# Patient Record
Sex: FEMALE | Race: WHITE | NOT HISPANIC OR LATINO | URBAN - METROPOLITAN AREA
[De-identification: names, ages, dates, MRNs, and addresses within clinical notes are randomized per-mention and may not be internally consistent; named-entity substitution may affect disease eponyms.]

---

## 2018-03-11 ENCOUNTER — EMERGENCY (EMERGENCY)
Facility: HOSPITAL | Age: 23
LOS: 1 days | Discharge: HOME | End: 2018-03-11
Admitting: EMERGENCY MEDICINE
Payer: COMMERCIAL

## 2018-03-11 VITALS
OXYGEN SATURATION: 100 % | DIASTOLIC BLOOD PRESSURE: 72 MMHG | HEART RATE: 108 BPM | TEMPERATURE: 98 F | SYSTOLIC BLOOD PRESSURE: 130 MMHG | RESPIRATION RATE: 16 BRPM

## 2018-03-11 PROCEDURE — 99282 EMERGENCY DEPT VISIT SF MDM: CPT

## 2018-03-11 RX ORDER — HYDROXYZINE HCL 10 MG
25 TABLET ORAL ONCE
Qty: 0 | Refills: 0 | Status: DISCONTINUED | OUTPATIENT
Start: 2018-03-11 | End: 2018-03-15

## 2018-03-11 NOTE — ED PROVIDER NOTE - MEDICAL DECISION MAKING DETAILS
23 y/o F hx Anxiety   To follow up with the [facility].  Hydroxyzine PRN  recommended   Medical evaluation performed. There is no clinical evidence of intoxication or any acute medical problem requiring immediate intervention.

## 2018-03-11 NOTE — ED ADULT TRIAGE NOTE - CHIEF COMPLAINT QUOTE
reports increasing anxiety over past 2 mos reports " depressing thoughts"  denies suicidal,homicidal thoughts. denies auditory,visual hallucinations

## 2018-03-11 NOTE — ED ADULT NURSE NOTE - OBJECTIVE STATEMENT
Received pt in  pt c/o anxiety/OCD pt denies SI/Hi/AVh at present emotional reassurance provided, eval on going.

## 2018-08-02 ENCOUNTER — EMERGENCY (EMERGENCY)
Facility: HOSPITAL | Age: 23
LOS: 1 days | Discharge: HOME | End: 2018-08-02
Attending: EMERGENCY MEDICINE
Payer: COMMERCIAL

## 2018-08-02 VITALS
OXYGEN SATURATION: 100 % | HEIGHT: 69 IN | SYSTOLIC BLOOD PRESSURE: 118 MMHG | HEART RATE: 101 BPM | TEMPERATURE: 98 F | RESPIRATION RATE: 18 BRPM | DIASTOLIC BLOOD PRESSURE: 75 MMHG | WEIGHT: 139.99 LBS

## 2018-08-02 DIAGNOSIS — R69 ILLNESS, UNSPECIFIED: ICD-10-CM

## 2018-08-02 DIAGNOSIS — F42.9 OBSESSIVE-COMPULSIVE DISORDER, UNSPECIFIED: ICD-10-CM

## 2018-08-02 DIAGNOSIS — F32.89 OTHER SPECIFIED DEPRESSIVE EPISODES: ICD-10-CM

## 2018-08-02 LAB
ALBUMIN SERPL ELPH-MCNC: 4.3 G/DL — SIGNIFICANT CHANGE UP (ref 3.3–5)
ALP SERPL-CCNC: 86 U/L — SIGNIFICANT CHANGE UP (ref 40–120)
ALT FLD-CCNC: 13 U/L — SIGNIFICANT CHANGE UP (ref 12–78)
ANION GAP SERPL CALC-SCNC: 10 MMOL/L — SIGNIFICANT CHANGE UP (ref 5–17)
APAP SERPL-MCNC: >2 UG/ML — LOW (ref 10–30)
AST SERPL-CCNC: 13 U/L — LOW (ref 15–37)
BASOPHILS # BLD AUTO: 0.04 K/UL — SIGNIFICANT CHANGE UP (ref 0–0.2)
BASOPHILS NFR BLD AUTO: 0.4 % — SIGNIFICANT CHANGE UP (ref 0–2)
BILIRUB SERPL-MCNC: 0.4 MG/DL — SIGNIFICANT CHANGE UP (ref 0.2–1.2)
BUN SERPL-MCNC: 11 MG/DL — SIGNIFICANT CHANGE UP (ref 7–23)
CALCIUM SERPL-MCNC: 9.3 MG/DL — SIGNIFICANT CHANGE UP (ref 8.5–10.1)
CHLORIDE SERPL-SCNC: 108 MMOL/L — SIGNIFICANT CHANGE UP (ref 96–108)
CO2 SERPL-SCNC: 26 MMOL/L — SIGNIFICANT CHANGE UP (ref 22–31)
CREAT SERPL-MCNC: 0.72 MG/DL — SIGNIFICANT CHANGE UP (ref 0.5–1.3)
EOSINOPHIL # BLD AUTO: 0.01 K/UL — SIGNIFICANT CHANGE UP (ref 0–0.5)
EOSINOPHIL NFR BLD AUTO: 0.1 % — SIGNIFICANT CHANGE UP (ref 0–6)
GLUCOSE SERPL-MCNC: 103 MG/DL — HIGH (ref 70–99)
HCT VFR BLD CALC: 39.2 % — SIGNIFICANT CHANGE UP (ref 34.5–45)
HGB BLD-MCNC: 14.2 G/DL — SIGNIFICANT CHANGE UP (ref 11.5–15.5)
IMM GRANULOCYTES NFR BLD AUTO: 0.4 % — SIGNIFICANT CHANGE UP (ref 0–1.5)
LYMPHOCYTES # BLD AUTO: 2.76 K/UL — SIGNIFICANT CHANGE UP (ref 1–3.3)
LYMPHOCYTES # BLD AUTO: 24.2 % — SIGNIFICANT CHANGE UP (ref 13–44)
MCHC RBC-ENTMCNC: 32.1 PG — SIGNIFICANT CHANGE UP (ref 27–34)
MCHC RBC-ENTMCNC: 36.2 GM/DL — HIGH (ref 32–36)
MCV RBC AUTO: 88.5 FL — SIGNIFICANT CHANGE UP (ref 80–100)
MONOCYTES # BLD AUTO: 0.62 K/UL — SIGNIFICANT CHANGE UP (ref 0–0.9)
MONOCYTES NFR BLD AUTO: 5.4 % — SIGNIFICANT CHANGE UP (ref 2–14)
NEUTROPHILS # BLD AUTO: 7.93 K/UL — HIGH (ref 1.8–7.4)
NEUTROPHILS NFR BLD AUTO: 69.5 % — SIGNIFICANT CHANGE UP (ref 43–77)
PLATELET # BLD AUTO: 226 K/UL — SIGNIFICANT CHANGE UP (ref 150–400)
POTASSIUM SERPL-MCNC: 3.6 MMOL/L — SIGNIFICANT CHANGE UP (ref 3.5–5.3)
POTASSIUM SERPL-SCNC: 3.6 MMOL/L — SIGNIFICANT CHANGE UP (ref 3.5–5.3)
PROT SERPL-MCNC: 8.6 G/DL — HIGH (ref 6–8.3)
RBC # BLD: 4.43 M/UL — SIGNIFICANT CHANGE UP (ref 3.8–5.2)
RBC # FLD: 11.7 % — SIGNIFICANT CHANGE UP (ref 10.3–14.5)
SALICYLATES SERPL-MCNC: <1.7 MG/DL — LOW (ref 2.8–20)
SODIUM SERPL-SCNC: 144 MMOL/L — SIGNIFICANT CHANGE UP (ref 135–145)
TSH SERPL-MCNC: 1.1 UIU/ML — SIGNIFICANT CHANGE UP (ref 0.36–3.74)
WBC # BLD: 11.4 K/UL — HIGH (ref 3.8–10.5)
WBC # FLD AUTO: 11.4 K/UL — HIGH (ref 3.8–10.5)

## 2018-08-02 PROCEDURE — 99284 EMERGENCY DEPT VISIT MOD MDM: CPT

## 2018-08-02 PROCEDURE — 90792 PSYCH DIAG EVAL W/MED SRVCS: CPT | Mod: GT

## 2018-08-02 NOTE — ED BEHAVIORAL HEALTH ASSESSMENT NOTE - RISK ASSESSMENT
low acute risk. no true suicidal ideation (see above,) no homicidal ideation, no cindy/psychosis, no prior self harm/suicidality, no subst abuse, supportive family, future orientation   acute risk factors of anxiety and some depressive sx's are outweighed by these protective factors.   chronic risk is elevated by anxiety d/o . low acute risk. no true suicidal ideation (see above,) no homicidal ideation, no cindy/psychosis, no prior self harm/suicidality, no subst abuse, supportive family, future orientation   acute risk factors of anxiety, intrusive thoughts and some depressive sx's exist but are outweighed by these protective factors listed above. hospitalization therefore not warranted.   chronic risk is elevated by anxiety d/o .

## 2018-08-02 NOTE — ED PROVIDER NOTE - OBJECTIVE STATEMENT
Pt. present to ED stating that " I am having an anxiety attack". Pt. also c/o feeling very depressed for the past 4 days. Pt. has not been eating or sleeping well. Pt. having some suicidal ideations but no specific plan. Pt. states that she has been feeling more anxious lately because she has not been performing her OCD rituals. It takes her a long time to perform them. Pt. has hx of Anxiety and OCD. Pt. has never been on any medications for her psychiatric medical problem. Pt. last saw her therapist/psychiatrist september of 2017.

## 2018-08-02 NOTE — ED BEHAVIORAL HEALTH ASSESSMENT NOTE - HPI (INCLUDE ILLNESS QUALITY, SEVERITY, DURATION, TIMING, CONTEXT, MODIFYING FACTORS, ASSOCIATED SIGNS AND SYMPTOMS)
22 y o  female, student on leave from [employer], domiciled with parents and 19 y o brother, single, no children/dependents, hx of OCD, no hx of hospitalizations/medications/ suicide attempts/self harm/aggression/legal/substance/medical hx; bib family for worsening OCD.  Patient is a good and detailed historian. States she was diagnosed by a therapist last year of having obsessional type OCD - while she has some hoarding and blinking behaviors, most of her rituals are purely mental and unseen. For example, she suffers from intrusive thoughts of going crazy or of harming herself, which are ego dystonic and cause her great anxiety, so the corresponding mental ritual is to silently say "Im not going to do it" or something similar , up to sixty four times. She does this ritual when she eats and when she showers. She decided to abruptly, "cold turkey" stop doing the rituals in the past week, since which time she is more anxious, having panic attacks as well as a constant sense of anxiety. Additionally, she hasn't been sleeping well at night, or eating as much or showering, due to being unwilling to do the mental ritual associated with the activities. she is more depressed, tearful at times. Patient was screened for any suicidal ideation intent or plan, and she denied it; she states that though she has intrusive thoughts of "harming myself" without any plan, they bother her greatly, as she wants to live, and therefore she performs obsessional rituals to rid herself of these thoughts. however, she also at times wonders if they are real, and not her OCD, and this causes her additional anxiety . However, she is future oriented and very much denies suicidal ideation intent or plan. she denies manic or psychotic sxs'. denies substance abuse. the stressor she points to is that she had been working as a  prior to her family moving, but is no longer working at that job. she took last year off from [employer] [facility] [employer] due to her anxiety but plans to go back.   collateral from mother: 22 y o  female, student on leave from [employer], domiciled with parents and 19 y o brother, single, no children/dependents, hx of OCD, no hx of hospitalizations/medications/ suicide attempts/self harm/aggression/legal/substance/medical hx; bib family for worsening OCD.  Patient is a good and detailed historian. States she was diagnosed by a therapist last year of having obsessional type OCD - while she has some hoarding and blinking behaviors, most of her rituals are purely mental and unseen. For example, she suffers from intrusive thoughts of going crazy or of harming herself, which are ego dystonic and cause her great anxiety, so the corresponding mental ritual is to silently say "Im not going to do it" or something similar , up to sixty four times. She does this ritual when she eats and when she showers. She decided to abruptly, "cold turkey" stop doing the rituals in the past week, since which time she is more anxious, having panic attacks as well as a constant sense of anxiety. Additionally, she hasn't been sleeping well at night, or eating as much or showering, due to being unwilling to do the mental ritual associated with the activities. she is more depressed, tearful at times. Patient was screened for any suicidal ideation intent or plan, and she denied it; she states that though she has intrusive thoughts of "harming myself" without any plan, they bother her greatly, as she wants to live, and therefore she performs obsessional rituals to rid herself of these thoughts. however, she also at times wonders if they are real, and not her OCD, and this causes her additional anxiety . However, she is future oriented and very much denies suicidal ideation intent or plan. she denies manic or psychotic sxs'. denies substance abuse. the stressor she points to is that she had been working as a  prior to her family moving, but is no longer working at that job. she took last year off from [employer] [facility] due to her anxiety but plans to go back.

## 2018-08-02 NOTE — ED BEHAVIORAL HEALTH ASSESSMENT NOTE - OTHER PAST PSYCHIATRIC HISTORY (INCLUDE DETAILS REGARDING ONSET, COURSE OF ILLNESS, INPATIENT/OUTPATIENT TREATMENT)
saw a therapist in 2017 for one month for OCD. no hospitalizations, no hx of meds, no suicide attempts

## 2018-08-02 NOTE — ED PROVIDER NOTE - PROGRESS NOTE DETAILS
Case discussed with Tele-psychiatrist to see the patient. Case discussed with Dr. Wynn(psychiatrist). Pt. will be a treat and release. SW setting up outpatient follow up. Dr. Wynn would not be oppose to discharging pt. on low dose Ativan(0.5mg) QD PRN. Brian Hager D.O. : RN made me aware that patient still anxious, feels suicidal, Dr. Wynn reconsulted, recommend 1mg ativan PO, wants to check in on her in another hour Brian Hager D.O.: sign out given to Dr. Morales Dr Wynn reevaluated the patient after the dose of ativan and he feels that she is stable for discharge but is to be D/C with her mother who is expected to return to the ED in the morning. The patients mother arrived at 7:30AM

## 2018-08-02 NOTE — ED BEHAVIORAL HEALTH ASSESSMENT NOTE - DESCRIPTION
none see hpi calm and cooperative ED Course: Per ED RN, patient has been cooperative, anxious, interacting with staff, spoke to staff about a 4hr ritual to help with anxiety that she does that prevents her from having time to focus on other things and reported that she has had a suicidal thought that maybe she’d be better off dead, but denied any plan or intent. Patient’s hygiene was noted to be poor, and patient stated that it has been about 1 week since she has showered and that she hasn’t brushed her teeth recently. Two friends visited patient in ED.      Mom Evelia Wan [phone]: Mom reports that patient is a 22 year old female, domiciled with Mom, Dad and Brother age 19, recently moved to [place] from [place], no children, non caregiver, unemployed (previously employed a few months ago), not enrolled in any educational program, diagnosed with Anxiety, not currently in outpatient tx, most recently in outpatient tx about 1 year ago, not taking any medications, with no hx of inaptient psychaitric hospitalizations, no hx of suicidal thoughts, self injury, suicide attempts, aggression/violence, substance use, medical hx, legal hx, trauma hx, with family hx of Maternal Aunt- Depression. Mom reports that patient has been increasingly anxious over the past few weeks, has been showering less due to anxiety about getting in and out of the shower, with decreased appetite and weightloss as observed by Mom, with self report of having trouble sleeping for the past 2 nights because of “bad thoughts” that patient gets which are worried about “going crazy.” Mom states that when patient experiences these thoughts of the possibility of “going crazy,” patient seems to struggle to perform tasks and has less interest in seeing friends. She states that she has noticed a pattern that every month before patient gets her period, symptoms of anxiety and anhedonia seem to worsen. Mom denies any hx of hallucinations, delusions or other psychotic symptoms. Mom states that patient was in therapy last year but did not think it worked, and after visiting [facility] ED in March 2018 and receiving a recommendation to go to [facility], patient did not follow up despite Mom encouraging her to do so. Mom states that patient has been opposed to medication due to fear of taking pills. Mom states that she believes that patient’s anxiety has worsened to a point where she believes patient would follow up with an outpatient referral. Mom states that she does not have acute safety concerns but believes that patient would benefit from outaptient treatment and being on medication to reduce her anxiety. Mom safety-planned and agreed to lock up medications in the home. No access to guns/weapons.

## 2018-08-02 NOTE — ED BEHAVIORAL HEALTH ASSESSMENT NOTE - SUMMARY
22 y o  female, student on leave from [employer], domiciled with parents and 19 y o brother, single, no children/dependents, hx of OCD, no hx of hospitalizations/medications/ suicide attempts/self harm/aggression/legal/substance/medical hx; bib family for worsening OCD.  pt has OCD symptoms of obsessive thoughts and mental rituals which are impairing and causing high levels of anxiety. while some of the obsessions have the content of harming herself, pt is bothered by these intrusive thoughts, does not have a plan or intention to act on them. they are more the quality of OCD obsessions than a true suicidal ideation. mother has no safety concerns. pt's symptoms are impairing and require treatmetnt but she is not an acute danger to herself or grossly unable to care for herself, therefore discharge and outpatient referral are appropriate. 22 y o  female, student on leave from [employer], domiciled with parents and 19 y o brother, single, no children/dependents, hx of OCD, no hx of hospitalizations/medications/ suicide attempts/self harm/aggression/legal/substance/medical hx; bib family for worsening OCD.  pt has OCD symptoms of obsessive thoughts and mental rituals which are impairing and causing high levels of anxiety. while some of the obsessions have the content of harming herself, pt is bothered by these intrusive thoughts, and more importantly does not have a plan or intention to act on them. she has no history of suicide attempts or self harm. they are more the quality of OCD obsessions than a true suicidal ideation. mother has no safety concerns. pt's symptoms are impairing and require treatment but she is not an acute danger to herself or grossly unable to care for herself, therefore discharge and outpatient referral are appropriate.

## 2018-08-02 NOTE — ED ADULT NURSE NOTE - OBJECTIVE STATEMENT
Assumed patient care @ 1840.  Pt received sitting on stretcher in no apparent distress. Pt AOx3 C/O feeling extremely anxious, patient is tearful at this time. Patient reports feelings of wanting to harm herself but no plan. Patient states she has anxiety, depression, and OCD but takes no meds. Patient stopped her OCD rituals x 4 days ago and has decreased appetite, sleeping and increased anxiety. Lungs clear to ausculation, respirations even unlabored. Abd soft non tender, + bowel sounds x 4quadrants. Denies Nausea, Vomiting, Diarrhea. Skin warm, dry, color appropriate for age and race.

## 2018-08-03 VITALS
RESPIRATION RATE: 18 BRPM | DIASTOLIC BLOOD PRESSURE: 64 MMHG | SYSTOLIC BLOOD PRESSURE: 107 MMHG | OXYGEN SATURATION: 98 % | TEMPERATURE: 98 F

## 2018-08-03 PROBLEM — F41.9 ANXIETY DISORDER, UNSPECIFIED: Chronic | Status: ACTIVE | Noted: 2018-03-11

## 2018-08-03 PROCEDURE — 99285 EMERGENCY DEPT VISIT HI MDM: CPT | Mod: 25

## 2018-08-03 PROCEDURE — 80053 COMPREHEN METABOLIC PANEL: CPT

## 2018-08-03 PROCEDURE — 84443 ASSAY THYROID STIM HORMONE: CPT

## 2018-08-03 PROCEDURE — 80307 DRUG TEST PRSMV CHEM ANLYZR: CPT

## 2018-08-03 PROCEDURE — 85027 COMPLETE CBC AUTOMATED: CPT

## 2018-08-03 RX ADMIN — Medication 1 MILLIGRAM(S): at 00:54

## 2018-08-03 NOTE — ED BEHAVIORAL HEALTH NOTE - BEHAVIORAL HEALTH NOTE
Earlier this evening after the patient had been due to be discharged, I received a call from Dr. Hager, who had taken over from Dr. Vides, stating pt was having suicidal thoughts and felt unable/unwilling to be discharged after receiving her discharge papers etc.  I explained that pt had suiciidal thoughts that were intrusive as part of her baseline OCD, and I also recommended pt be given Ativan 1mg at that time, which she was.  I called later in the night and spoke to the new covering physician, Dr. michael, as well as the nurse involved in the patient's care.  I also got back on the device and did my own reassessment.  Patient reported feeling "much better," which she attributed to the ativan. She says earlier she felt as if she would have to perform a mental ritual to deal with intrusive thoughts with a suicidal theme at the time of discharge.  Again, she was not having Suiciidal intentions or plans, but rather intrusive thoughts.  Nonetheless, she feels now that she would benefit from a couple of hours of sleep in the ED. I asked if someone, preferably pt's mother, could come to  the patient and take her home in the morning.   Patient said her mother works overnight but would be available to pick her up around 8am. She also said she would like to be discharged, has no current suicidal ideation intent or plan, and feels like she will be ready to be discharged in the am, and is willing to take ativan 1mg at that time to help alleviate the anxiety associated with discharge if needed.   I relayed this plan and recommendations to dr. michael who expressed agreement, and will sign out this plan of care to the morning telepsych attending should the patient still be in the ED at change of shift at 0830.

## 2018-08-03 NOTE — ED ADULT NURSE REASSESSMENT NOTE - NS ED NURSE REASSESS COMMENT FT1
Pt states she feels anxious again and refusing to leave. Charge RN sohail made aware. Pt to received 1 dose of PO ativan and reassess as per Dr. Hager. Pt placed back on 1:1.

## 2018-08-03 NOTE — ED ADULT NURSE REASSESSMENT NOTE - DESCRIPTION
Ativan administered earlier. Pt reports improved anxiety. Still States " I don't feel safe going home. Im still having bad thoughts and bad plans." Friend at the bedside. Pt calm and resting in bed. TV, pillow, and blanket provided. Plan of care, reassessment, pain.

## 2018-08-03 NOTE — ED ADULT NURSE REASSESSMENT NOTE - NS ED NURSE REASSESS COMMENT FT1
Spoke with Dr. Mosquera at [facility]. Plan of care discussed. Team Concern of safety to self (patient)  upon discharge expressed and reiterated with Dr. Mosquera. Dr. Mosquera feels patient can be safely discharged home with suicidal thoughts. Pt remains on 1:1 obs. Will continue to monitor.

## 2020-03-10 NOTE — ED ADULT TRIAGE NOTE - RESPIRATORY RATE (BREATHS/MIN)
I have reviewed discharge instructions with the parent. The parent verbalized understanding. Patient armband removed and shredded
18

## 2020-06-23 NOTE — ED PROVIDER NOTE - OBJECTIVE STATEMENT
21 y/o F hx Anxiety presents to ER w c/o intermittent episodes of anxiousness over the course of the past 3 weeks. Admits to functioning  , able to work and live a normal life. States ' I might needs medications now"  Denies pain, SOB, fever, chills, chest/ abdominal discomfort. Denies SI/HI/AH/VH. Denies falling, punching or kicking any objects.  LNMP - 2/2018 Yes

## 2020-07-22 NOTE — ED ADULT NURSE REASSESSMENT NOTE - NS ED NURSE REASSESS COMMENT FT1
Outpatient HIPPA forms signed, cosigned, and faxed back to [facility]. DC forms signed. 41 yo female c pmhx HTN, prediabetes, HLD presents to ED c/o chest pain intermittent x 2 weeks.  Pt states has had chest discomfort like this in the past but for the past 2 weeks symptoms have been getting more persistent.  Pt describes chest pain as pressure like, substernal and worse with exertion.  Pt states was seen at [facility] where she had two normal EKGs and told to follow up with cardiologist and PCP.  Pt states when saw cardiologist they could not do a stress test because her BP was too high.  +SOB on exertion.  Denies any abd pain, n/v, fever, recent travel, leg swelling, sick contacts.    CDU EREN Morin: 40 y.o female pmhx of HTN, HLD, prediabetes coming in wi th months of chest pain and SOB- in january states was seen at an outside hospital at discharge was told she needed a stress test- when she went to get it her BP was too high and couldn't get it then COVID happened and she wasn't able to follow up. Today coming in that over the last 2 week symptoms have been persisting of chest pain and sob and STAUFFER prompting her to come in. Denies fevers, chills, cough, n/v/d, abdominal pain, numbness, tingling, weakness, urinary symptoms.

## 2023-06-08 PROBLEM — F32.9 MAJOR DEPRESSIVE DISORDER, SINGLE EPISODE, UNSPECIFIED: Chronic | Status: ACTIVE | Noted: 2018-08-02

## 2023-06-08 PROBLEM — F41.9 ANXIETY DISORDER, UNSPECIFIED: Chronic | Status: ACTIVE | Noted: 2018-08-02

## 2023-06-08 PROBLEM — F42.9 OBSESSIVE-COMPULSIVE DISORDER, UNSPECIFIED: Chronic | Status: ACTIVE | Noted: 2018-08-02

## 2023-12-31 NOTE — ED ADULT NURSE NOTE - NSSISCREENINGQ5_ED_A_ED
Problem: Pain  Goal: #Acceptable pain level achieved/maintained at rest using NRS/Faces  Description: This goal is used for patients who can self-report.  Acceptable means the level is at or below the identified comfort/function goal.  Outcome: Outcome Met, Continue evaluating goal progress toward completion  Goal: # Acceptable pain level achieved/maintained with activity using NRS/Faces  Description: This goal is used for patients who can self-report and are not achieving acceptable pain control during activity.  Outcome: Outcome Met, Continue evaluating goal progress toward completion  Goal: # Verbalizes understanding of pain management  Description: Documented in Patient Education Activity  Outcome: Outcome Not Met, Continue to Monitor     Problem: Pressure Injury, Risk for  Goal: # Skin remains intact  Outcome: Outcome Met, Continue evaluating goal progress toward completion  Goal: No new pressure injury (PI) development  Outcome: Outcome Met, Continue evaluating goal progress toward completion     Problem: Non-Pressure Injury Wound  Goal: # No deterioration in wound  Outcome: Outcome Not Met, Continue to Monitor  Goal: # Verbalizes understanding of wound and wound care  Description: If abnormality is a skin tear, avoid using tape on skin including transparent dressings. Document education using the patient education activity.  Outcome: Outcome Not Met, Continue to Monitor  Goal: Participates in wound care activities  Outcome: Outcome Not Met, Continue to Monitor     Problem: VTE, Risk for  Goal: # No s/s of VTE  Outcome: Outcome Met, Continue evaluating goal progress toward completion  Goal: # Verbalizes understanding of VTE risk factors and prevention  Description: Document education using the patient education activity.   Outcome: Outcome Not Met, Continue to Monitor     Problem: Diabetes  Goal: Achieves glycemic balance  Description: Goal is to maintain blood sugar within range with no episodes of  hypoglycemia  Outcome: Outcome Not Met, Continue to Monitor  Goal: Verbalizes understanding of diabetes management including how to use HbA1C to evaluate status of blood sugar over time (Diabetes is NOT a new diagnosis)  Description: Diabetes Education  Outcome: Outcome Not Met, Continue to Monitor      No

## 2024-03-25 NOTE — ED ADULT NURSE NOTE - PAIN RATING/NUMBER SCALE (0-10): REST
0
Partially impaired: cannot see medication labels or newsprint, but can see obstacles in path, and the surrounding layout; can count fingers at arm's length

## 2025-07-01 ENCOUNTER — APPOINTMENT (OUTPATIENT)
Age: 30
End: 2025-07-01
Payer: MEDICAID

## 2025-07-01 VITALS
WEIGHT: 182 LBS | DIASTOLIC BLOOD PRESSURE: 60 MMHG | BODY MASS INDEX: 26.96 KG/M2 | SYSTOLIC BLOOD PRESSURE: 100 MMHG | HEIGHT: 69 IN

## 2025-07-01 PROCEDURE — 99459 PELVIC EXAMINATION: CPT

## 2025-07-01 PROCEDURE — 99385 PREV VISIT NEW AGE 18-39: CPT

## 2025-07-01 RX ORDER — SERTRALINE HYDROCHLORIDE 25 MG/1
TABLET, FILM COATED ORAL
Refills: 0 | Status: ACTIVE | COMMUNITY

## 2025-07-07 LAB — CYTOLOGY CVX/VAG DOC THIN PREP: NORMAL
